# Patient Record
Sex: MALE
[De-identification: names, ages, dates, MRNs, and addresses within clinical notes are randomized per-mention and may not be internally consistent; named-entity substitution may affect disease eponyms.]

---

## 2018-05-21 PROBLEM — Z00.00 ENCOUNTER FOR PREVENTIVE HEALTH EXAMINATION: Status: ACTIVE | Noted: 2018-05-21

## 2022-08-22 ENCOUNTER — APPOINTMENT (OUTPATIENT)
Dept: ORTHOPEDIC SURGERY | Facility: CLINIC | Age: 87
End: 2022-08-22

## 2022-08-22 VITALS — BODY MASS INDEX: 22.73 KG/M2 | WEIGHT: 150 LBS | HEIGHT: 68 IN

## 2022-08-22 DIAGNOSIS — Z78.9 OTHER SPECIFIED HEALTH STATUS: ICD-10-CM

## 2022-08-22 DIAGNOSIS — M54.16 RADICULOPATHY, LUMBAR REGION: ICD-10-CM

## 2022-08-22 DIAGNOSIS — M48.061 SPINAL STENOSIS, LUMBAR REGION WITHOUT NEUROGENIC CLAUDICATION: ICD-10-CM

## 2022-08-22 PROCEDURE — 99203 OFFICE O/P NEW LOW 30 MIN: CPT

## 2022-08-22 RX ORDER — IRBESARTAN 300 MG/1
300 TABLET, FILM COATED ORAL
Refills: 0 | Status: ACTIVE | COMMUNITY

## 2022-08-29 NOTE — HISTORY OF PRESENT ILLNESS
[Left Leg] : left leg [Gradual] : gradual [8] : 8 [0] : 0 [Dull/Aching] : dull/aching [Constant] : constant [Standing] : standing [Walking] : walking [de-identified] : 89 yo M presenting with chronic low back pain x 30+ years, worsening recently without new injury. Reports pain left sided down the buttock into the lateral thigh down the calf. Worse with walking. Reports that he had a LLD d/t L TKA done decades ago. Saw PMD Dr Johnson given MDP without relief and refered for MRI. Reports 30 years ago he did LESI and acupuncture that helped temporarily. No recent treatments. no bb incontience.  [] : no [FreeTextEntry1] : Left back  [FreeTextEntry5] : Pt states that due to a previous knee replacement surgery on the right knee (about 40 years ago) that his hips are out of line and that is resulting in his left back pain that travels down his leg. [FreeTextEntry7] : Down the left leg [FreeTextEntry9] : CBD & CBG roll on relief [de-identified] : 8/2 [de-identified] : Kedar Johnson [de-identified] : MRI

## 2022-08-29 NOTE — ASSESSMENT
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

## 2022-08-29 NOTE — PROCEDURE
[FreeTextEntry3] : The risks, benefits and contents of the injection have been discussed.  Risks include but are not limited to allergic reaction, flare reaction, permanent white skin discoloration at the injection site and infection.  The patient understands the risks and agrees to having the injection.  All questions have been answered.\par \par Trigger point injection was administered into the left lumbar paraspinal muscle. The site was prepped with alcohol and betadine. An injection of Lidocaine 4cc of 1% , kenalog 60mg was administered. Patient tolerated procedure well. \par \par Patient was advised to call if redness, pain or fever occur and apply ice for 15 minutes out of every hour for the next 12-24 hours as tolerated. \par \par Patient has tried OTC's including aspirin, Ibuprofen, Aleve, etc or prescription NSAIDS, and/or exercises at home and/or physical therapy without satisfactory response, patient had decreased mobility and the risks benefits, and alternatives have been discussed, and verbal consent was obtained.